# Patient Record
Sex: MALE | Race: WHITE | NOT HISPANIC OR LATINO | Employment: UNEMPLOYED | URBAN - METROPOLITAN AREA
[De-identification: names, ages, dates, MRNs, and addresses within clinical notes are randomized per-mention and may not be internally consistent; named-entity substitution may affect disease eponyms.]

---

## 2022-01-18 ENCOUNTER — TELEPHONE (OUTPATIENT)
Dept: FAMILY MEDICINE CLINIC | Facility: CLINIC | Age: 22
End: 2022-01-18

## 2022-01-18 ENCOUNTER — OFFICE VISIT (OUTPATIENT)
Dept: FAMILY MEDICINE CLINIC | Facility: CLINIC | Age: 22
End: 2022-01-18

## 2022-01-18 ENCOUNTER — LAB (OUTPATIENT)
Dept: FAMILY MEDICINE CLINIC | Facility: CLINIC | Age: 22
End: 2022-01-18

## 2022-01-18 VITALS
HEART RATE: 79 BPM | WEIGHT: 158.6 LBS | BODY MASS INDEX: 24.04 KG/M2 | OXYGEN SATURATION: 98 % | HEIGHT: 68 IN | TEMPERATURE: 98.4 F | SYSTOLIC BLOOD PRESSURE: 121 MMHG | DIASTOLIC BLOOD PRESSURE: 65 MMHG

## 2022-01-18 DIAGNOSIS — Z00.00 ENCOUNTER FOR WELL ADULT EXAM WITHOUT ABNORMAL FINDINGS: Primary | ICD-10-CM

## 2022-01-18 DIAGNOSIS — Z11.59 ENCOUNTER FOR HEPATITIS C SCREENING TEST FOR LOW RISK PATIENT: ICD-10-CM

## 2022-01-18 DIAGNOSIS — Z00.00 ENCOUNTER FOR WELL ADULT EXAM WITHOUT ABNORMAL FINDINGS: ICD-10-CM

## 2022-01-18 LAB
ALBUMIN SERPL-MCNC: 4.2 G/DL (ref 3.5–5.2)
ALBUMIN/GLOB SERPL: 1.8 G/DL
ALP SERPL-CCNC: 61 U/L (ref 39–117)
ALT SERPL W P-5'-P-CCNC: 23 U/L (ref 1–41)
ANION GAP SERPL CALCULATED.3IONS-SCNC: 5.8 MMOL/L (ref 5–15)
AST SERPL-CCNC: 23 U/L (ref 1–40)
BASOPHILS # BLD AUTO: 0.04 10*3/MM3 (ref 0–0.2)
BASOPHILS NFR BLD AUTO: 0.5 % (ref 0–1.5)
BILIRUB SERPL-MCNC: 0.3 MG/DL (ref 0–1.2)
BUN SERPL-MCNC: 15 MG/DL (ref 6–20)
BUN/CREAT SERPL: 19 (ref 7–25)
CALCIUM SPEC-SCNC: 9 MG/DL (ref 8.6–10.5)
CHLORIDE SERPL-SCNC: 103 MMOL/L (ref 98–107)
CO2 SERPL-SCNC: 29.2 MMOL/L (ref 22–29)
CREAT SERPL-MCNC: 0.79 MG/DL (ref 0.76–1.27)
DEPRECATED RDW RBC AUTO: 42.5 FL (ref 37–54)
EOSINOPHIL # BLD AUTO: 1.12 10*3/MM3 (ref 0–0.4)
EOSINOPHIL NFR BLD AUTO: 14 % (ref 0.3–6.2)
ERYTHROCYTE [DISTWIDTH] IN BLOOD BY AUTOMATED COUNT: 13 % (ref 12.3–15.4)
GFR SERPL CREATININE-BSD FRML MDRD: 124 ML/MIN/1.73
GLOBULIN UR ELPH-MCNC: 2.4 GM/DL
GLUCOSE SERPL-MCNC: 76 MG/DL (ref 65–99)
HCT VFR BLD AUTO: 44.6 % (ref 37.5–51)
HCV AB SER DONR QL: NORMAL
HGB BLD-MCNC: 14.8 G/DL (ref 13–17.7)
IMM GRANULOCYTES # BLD AUTO: 0.02 10*3/MM3 (ref 0–0.05)
IMM GRANULOCYTES NFR BLD AUTO: 0.3 % (ref 0–0.5)
LYMPHOCYTES # BLD AUTO: 1.66 10*3/MM3 (ref 0.7–3.1)
LYMPHOCYTES NFR BLD AUTO: 20.8 % (ref 19.6–45.3)
MCH RBC QN AUTO: 29.6 PG (ref 26.6–33)
MCHC RBC AUTO-ENTMCNC: 33.2 G/DL (ref 31.5–35.7)
MCV RBC AUTO: 89.2 FL (ref 79–97)
MONOCYTES # BLD AUTO: 0.56 10*3/MM3 (ref 0.1–0.9)
MONOCYTES NFR BLD AUTO: 7 % (ref 5–12)
NEUTROPHILS NFR BLD AUTO: 4.58 10*3/MM3 (ref 1.7–7)
NEUTROPHILS NFR BLD AUTO: 57.4 % (ref 42.7–76)
NRBC BLD AUTO-RTO: 0 /100 WBC (ref 0–0.2)
PLATELET # BLD AUTO: 152 10*3/MM3 (ref 140–450)
PMV BLD AUTO: 12 FL (ref 6–12)
POTASSIUM SERPL-SCNC: 4.6 MMOL/L (ref 3.5–5.2)
PROT SERPL-MCNC: 6.6 G/DL (ref 6–8.5)
RBC # BLD AUTO: 5 10*6/MM3 (ref 4.14–5.8)
SODIUM SERPL-SCNC: 138 MMOL/L (ref 136–145)
TSH SERPL DL<=0.05 MIU/L-ACNC: 1.9 UIU/ML (ref 0.27–4.2)
WBC NRBC COR # BLD: 7.98 10*3/MM3 (ref 3.4–10.8)

## 2022-01-18 PROCEDURE — 36415 COLL VENOUS BLD VENIPUNCTURE: CPT

## 2022-01-18 PROCEDURE — 86803 HEPATITIS C AB TEST: CPT | Performed by: FAMILY MEDICINE

## 2022-01-18 PROCEDURE — 80050 GENERAL HEALTH PANEL: CPT | Performed by: FAMILY MEDICINE

## 2022-01-18 PROCEDURE — 99385 PREV VISIT NEW AGE 18-39: CPT | Performed by: FAMILY MEDICINE

## 2022-01-18 RX ORDER — ESCITALOPRAM OXALATE 20 MG/1
TABLET ORAL
COMMUNITY
Start: 2021-12-05 | End: 2022-09-12

## 2022-01-18 RX ORDER — DIVALPROEX SODIUM 250 MG/1
TABLET, DELAYED RELEASE ORAL
COMMUNITY
Start: 2022-01-07

## 2022-01-18 NOTE — ASSESSMENT & PLAN NOTE
Discussed healthy diet and  importance of regular exercise and recommend starting or continuing a regular exercise program for good health.  Stressed importance of moderation in sodium/caffeine intake, saturated fat and cholesterol, caloric balance, sufficient intake of fresh fruits and vegetables.  Recommend annual flu shot and COVID-19 vaccine.

## 2022-01-18 NOTE — PROGRESS NOTES
Draius Ortiz is a 21 y.o. male.     21-year-old male patient present for establish care/annual physical.  He has known history of schizo affective/mood disorder under care of  [facility] in [facility].  He denies anxiety, depression, mood swings, behavioral changes and insomnia.  He is taking medication as prescribed and denies any medication related side effects.The patien denies fatigue, cold intolerance, headache, cough,chest pain, abdominal pain, nausea, vomiting, palpitations, dizziness and SOB. The patient admits to dietary compliance is  fair  and exercising occasionally.  He is a non-smoker.  He works at [employer].        The following portions of the patient's history were reviewed and updated as appropriate: past medical history, past social history, past surgical history and problem list.    Review of Systems   Constitutional: Negative for fatigue and fever.   HENT: Negative for ear pain, sinus pressure, sore throat and trouble swallowing.    Eyes: Negative for blurred vision.   Respiratory: Negative for cough, shortness of breath and wheezing.    Cardiovascular: Negative for chest pain, palpitations and leg swelling.   Gastrointestinal: Negative for abdominal pain, diarrhea, nausea and vomiting.   Endocrine: Negative for cold intolerance, heat intolerance, polydipsia, polyphagia and polyuria.   Musculoskeletal: Negative for back pain.   Skin: Negative for rash.   Neurological: Negative for dizziness and headache.   Psychiatric/Behavioral: Negative for agitation, behavioral problems, sleep disturbance, suicidal ideas and depressed mood. The patient is not nervous/anxious.      Vitals:    01/18/22 0823   BP: 121/65   Pulse: 79   Temp: 98.4 °F (36.9 °C)   SpO2: 98%     Body mass index is 24.12 kg/m².     Current Outpatient Medications on File Prior to Visit   Medication Sig Dispense Refill   • Cariprazine HCl (Vraylar) 1.5 MG capsule capsule Take 1.5 mg by mouth Daily.     • divalproex  (DEPAKOTE) 250 MG DR tablet      • escitalopram (LEXAPRO) 20 MG tablet        No current facility-administered medications on file prior to visit.         Objective   Physical Exam  Constitutional:       General: He is not in acute distress.     Appearance: He is well-developed.   HENT:      Right Ear: Tympanic membrane, ear canal and external ear normal.      Left Ear: Tympanic membrane, ear canal and external ear normal.   Eyes:      Conjunctiva/sclera: Conjunctivae normal.   Cardiovascular:      Rate and Rhythm: Normal rate.      Pulses: Normal pulses.      Heart sounds: Normal heart sounds.   Pulmonary:      Effort: Pulmonary effort is normal.      Breath sounds: Normal breath sounds. No wheezing.   Abdominal:      General: Bowel sounds are normal.      Palpations: Abdomen is soft.      Tenderness: There is no abdominal tenderness.   Musculoskeletal:         General: Normal range of motion.      Cervical back: Normal range of motion and neck supple.   Neurological:      Mental Status: He is alert and oriented to person, place, and time.   Psychiatric:         Mood and Affect: Mood normal.         Behavior: Behavior normal.           Assessment/Plan   Problems Addressed this Visit        Health Encounters    Encounter for well adult exam without abnormal findings - Primary     Discussed healthy diet and  importance of regular exercise and recommend starting or continuing a regular exercise program for good health.  Stressed importance of moderation in sodium/caffeine intake, saturated fat and cholesterol, caloric balance, sufficient intake of fresh fruits and vegetables.  Recommend annual flu shot and COVID-19 vaccine.         Relevant Orders    Comprehensive metabolic panel    CBC w AUTO Differential    TSH       Infectious Diseases    Encounter for hepatitis C screening test for low risk patient    Relevant Orders    Hepatitis C antibody      Diagnoses       Codes Comments    Encounter for well adult exam  without abnormal findings    -  Primary ICD-10-CM: Z00.00  ICD-9-CM: V70.0     Encounter for hepatitis C screening test for low risk patient     ICD-10-CM: Z11.59  ICD-9-CM: V73.89

## 2022-09-12 ENCOUNTER — OFFICE VISIT (OUTPATIENT)
Dept: FAMILY MEDICINE CLINIC | Facility: CLINIC | Age: 22
End: 2022-09-12

## 2022-09-12 VITALS
SYSTOLIC BLOOD PRESSURE: 114 MMHG | WEIGHT: 176 LBS | BODY MASS INDEX: 26.67 KG/M2 | DIASTOLIC BLOOD PRESSURE: 78 MMHG | OXYGEN SATURATION: 97 % | TEMPERATURE: 99.1 F | HEIGHT: 68 IN | HEART RATE: 106 BPM | RESPIRATION RATE: 16 BRPM

## 2022-09-12 DIAGNOSIS — F10.29 ALCOHOL DEPENDENCE WITH UNSPECIFIED ALCOHOL-INDUCED DISORDER: Primary | ICD-10-CM

## 2022-09-12 PROCEDURE — 99213 OFFICE O/P EST LOW 20 MIN: CPT | Performed by: FAMILY MEDICINE

## 2022-09-12 NOTE — PROGRESS NOTES
Darius Ortiz is a 21 y.o. male.     History of Present Illness  21-year-old male patient presents for ER follow-up.  Patient was seen at [facility] ER on 9/7/22 brought by EMS. The patient works at [employer], that day he was having tremor, acting odd with  behavior issue.  Patient has known history of alcohol abuse he is a heavy drinker.  He was sent to ER for further evaluation.  Hospital records reviewed patient breath  alcohol test was positive and he was treated with fluids and IV Ativan.  Patient  stated he is currently seeing therapist and  psychiatrist at [facility].  Also he will join AA meetings soon.  He denies headache, dizziness, anxiety, depression hallucination, trouble sleeping, nausea, vomiting and abdominal pain.         The following portions of the patient's history were reviewed and updated as appropriate: past medical history, past social history, past surgical history and problem list.    Review of Systems   Constitutional: Positive for fatigue. Negative for activity change and appetite change.   HENT: Negative for trouble swallowing.    Eyes: Negative for blurred vision.   Respiratory: Negative for shortness of breath.    Cardiovascular: Negative for chest pain and palpitations.   Gastrointestinal: Negative for abdominal pain, nausea, vomiting and indigestion.   Neurological: Negative for dizziness and headache.   Psychiatric/Behavioral: Negative for behavioral problems, sleep disturbance, suicidal ideas and depressed mood. The patient is not nervous/anxious.        Objective   Physical Exam  Vitals reviewed.   Constitutional:       General: He is not in acute distress.     Appearance: Normal appearance. He is well-developed.   Eyes:      Conjunctiva/sclera: Conjunctivae normal.   Neck:      Thyroid: No thyromegaly.   Cardiovascular:      Rate and Rhythm: Normal rate.      Heart sounds: Normal heart sounds.   Pulmonary:      Effort: Pulmonary effort is normal.       Breath sounds: Normal breath sounds. No wheezing.   Abdominal:      General: Bowel sounds are normal.      Palpations: Abdomen is soft.      Tenderness: There is no abdominal tenderness.   Musculoskeletal:         General: Normal range of motion.      Cervical back: Normal range of motion.   Neurological:      General: No focal deficit present.      Mental Status: He is alert and oriented to person, place, and time.   Psychiatric:         Mood and Affect: Mood normal.         Behavior: Behavior normal.       Vitals:    09/12/22 1545   BP: 114/78   Pulse: 106   Resp: 16   Temp: 99.1 °F (37.3 °C)   SpO2: 97%     Current Outpatient Medications on File Prior to Visit   Medication Sig Dispense Refill   • Cariprazine HCl (VRAYLAR) 1.5 MG capsule capsule Take 1.5 mg by mouth Daily.     • divalproex (DEPAKOTE) 250 MG DR tablet        No current facility-administered medications on file prior to visit.         Assessment & Plan   Problems Addressed this Visit        Mental Health    Alcohol dependence with unspecified alcohol-induced disorder (HCC) - Primary     [facility] ER records reviewed.  Patient currently seeing the therapist and psychiatrist at [facility], also state he has not drinks alcohol since hospital visit.   Discussed the hazards of alcohol use.  Recommended to join AA meetings to help to quit alcohol           Diagnoses       Codes Comments    Alcohol dependence with unspecified alcohol-induced disorder (HCC)- ER follow up    -  Primary ICD-10-CM: F10.29  ICD-9-CM: 303.90, 291.9

## 2022-09-13 ENCOUNTER — TELEPHONE (OUTPATIENT)
Dept: FAMILY MEDICINE CLINIC | Facility: CLINIC | Age: 22
End: 2022-09-13

## 2022-09-13 NOTE — TELEPHONE ENCOUNTER
Caller: Nathanael Arellano    Relationship: Self    Best call back number:[phone] (M)    What is the best time to reach you: ANYTIME, ASAP    Who are you requesting to speak with (clinical staff, provider,  specific staff member): CLINICAL STAFF/ DR BLAIR    Do you know the name of the person who called: NATHANAEL ARELLANO    What was the call regarding: PATIENT IS ASKING FOR RETURN TO WORK DOCUMENTATION FROM DR BLAIR THAT INCLUDES THE DIAGNOSIS FROM HIS EMERGENCY ROOM FILE OF SYMTPTOMS OF ALCOHOL WITHDRAWAL AND HER SIGNATURE SO HE CAN RETURN TO WORK ASAP, PLEASE ADVISE PATIENT WHEN THIS IS READY TO BE PICKED UP ASAP    Do you require a callback: YES, ASAP

## 2022-09-19 NOTE — ASSESSMENT & PLAN NOTE
[facility] ER records reviewed.  Patient currently seeing the therapist and psychiatrist at [facility], also state he has not drinks alcohol since hospital visit.   Discussed the hazards of alcohol use.  Recommended to join AA meetings to help to quit alcohol

## 2022-12-05 ENCOUNTER — HOSPITAL ENCOUNTER (OUTPATIENT)
Facility: HOSPITAL | Age: 22
Discharge: HOME | End: 2022-12-05
Attending: EMERGENCY MEDICINE | Admitting: EMERGENCY MEDICINE

## 2022-12-05 VITALS
HEIGHT: 70 IN | BODY MASS INDEX: 23.62 KG/M2 | WEIGHT: 165 LBS | RESPIRATION RATE: 16 BRPM | OXYGEN SATURATION: 97 % | SYSTOLIC BLOOD PRESSURE: 130 MMHG | TEMPERATURE: 97.7 F | DIASTOLIC BLOOD PRESSURE: 86 MMHG | HEART RATE: 122 BPM

## 2022-12-05 DIAGNOSIS — K21.9 GASTROESOPHAGEAL REFLUX DISEASE, UNSPECIFIED WHETHER ESOPHAGITIS PRESENT: ICD-10-CM

## 2022-12-05 DIAGNOSIS — Z13.9 ENCOUNTER FOR MEDICAL SCREENING EXAMINATION: Primary | ICD-10-CM

## 2022-12-05 LAB
FLUAV SUBTYP SPEC NAA+PROBE: NOT DETECTED
FLUBV RNA ISLT QL NAA+PROBE: NOT DETECTED
SARS-COV-2 RNA RESP QL NAA+PROBE: NOT DETECTED

## 2022-12-05 PROCEDURE — 87636 SARSCOV2 & INF A&B AMP PRB: CPT | Performed by: EMERGENCY MEDICINE

## 2022-12-05 PROCEDURE — 99202 OFFICE O/P NEW SF 15 MIN: CPT | Performed by: EMERGENCY MEDICINE

## 2022-12-05 PROCEDURE — G0463 HOSPITAL OUTPT CLINIC VISIT: HCPCS | Performed by: EMERGENCY MEDICINE

## 2023-01-23 ENCOUNTER — OFFICE VISIT (OUTPATIENT)
Dept: FAMILY MEDICINE CLINIC | Facility: CLINIC | Age: 23
End: 2023-01-23

## 2023-01-23 VITALS
WEIGHT: 181.4 LBS | BODY MASS INDEX: 25.97 KG/M2 | RESPIRATION RATE: 16 BRPM | HEIGHT: 70 IN | OXYGEN SATURATION: 99 % | TEMPERATURE: 97.7 F | HEART RATE: 104 BPM | SYSTOLIC BLOOD PRESSURE: 125 MMHG | DIASTOLIC BLOOD PRESSURE: 76 MMHG

## 2023-01-23 DIAGNOSIS — F10.29 ALCOHOL DEPENDENCE WITH UNSPECIFIED ALCOHOL-INDUCED DISORDER: ICD-10-CM

## 2023-01-23 DIAGNOSIS — F33.8 OTHER RECURRENT DEPRESSIVE DISORDERS: Primary | ICD-10-CM

## 2023-01-23 PROCEDURE — 99213 OFFICE O/P EST LOW 20 MIN: CPT | Performed by: FAMILY MEDICINE

## 2023-01-23 RX ORDER — FLUOXETINE HYDROCHLORIDE 20 MG/1
20 CAPSULE ORAL DAILY
COMMUNITY

## 2023-01-23 NOTE — PROGRESS NOTES
Darius Ortiz is a 22 y.o. male.     History of Present Illness     22-year-old male patient present for follow-up visit.  He is currently under the care of of psych and therapist at [facility] for depression and psycho affective disorder.  He has alcohol dependency per patient he quit for a month then he started drinking again since last 3 months.  Patient is stated he drank 1/8 of gin every night.  He is complaining of depressed mood, anxiety but denies trouble sleep, nausea, vomiting, abdominal pain, shortness of breath and chest pain.    The following portions of the patient's history were reviewed and updated as appropriate: past medical history, past social history, past surgical history and problem list.    Review of Systems   Cardiovascular: Negative for chest pain and palpitations.   Psychiatric/Behavioral: Positive for depressed mood. Negative for agitation, sleep disturbance and suicidal ideas. The patient is nervous/anxious.        Objective   Physical Exam  Vitals reviewed.   Pulmonary:      Effort: Pulmonary effort is normal.   Neurological:      Mental Status: He is alert and oriented to person, place, and time.   Psychiatric:         Mood and Affect: Mood is depressed.         Speech: Speech normal.         Behavior: Behavior normal.       Vitals:    01/23/23 0808   BP: 125/76   Pulse: 104   Resp: 16   Temp: 97.7 °F (36.5 °C)   SpO2: 99%     Current Outpatient Medications on File Prior to Visit   Medication Sig Dispense Refill   • divalproex (DEPAKOTE) 250 MG DR tablet      • FLUoxetine (PROzac) 20 MG capsule Take 20 mg by mouth Daily.     • [DISCONTINUED] Cariprazine HCl (VRAYLAR) 1.5 MG capsule capsule Take 1.5 mg by mouth Daily.       No current facility-administered medications on file prior to visit.           Assessment & Plan   Problems Addressed this Visit        Mental Health    Alcohol dependence with unspecified alcohol-induced disorder (HCC)     Discussed health hazards for  alcohol and smoking on the health.   Counseled to quit smoking and drinking.  Patient was going to AA meeting in the past but has stopped going, encouraged to join those meetings.         Other recurrent depressive disorders (HCC) - Primary     Patient is under care of psychiatrist at [facility].  He also sees therapist once a month.  His symptoms are currently stable at [facility].  The patient verified not suicidal at this time.         Relevant Medications    FLUoxetine (PROzac) 20 MG capsule   Diagnoses       Codes Comments    Other recurrent depressive disorders (HCC)    -  Primary ICD-10-CM: F33.8  ICD-9-CM: 296.99     Alcohol dependence with unspecified alcohol-induced disorder (HCC)     ICD-10-CM: F10.29  ICD-9-CM: 303.90, 291.9

## 2023-01-23 NOTE — ASSESSMENT & PLAN NOTE
Discussed health hazards for alcohol and smoking on the health.   Counseled to quit smoking and drinking.  Patient was going to AA meeting in the past but has stopped going, encouraged to join those meetings.

## 2023-01-23 NOTE — ASSESSMENT & PLAN NOTE
Patient is under care of psychiatrist at [facility].  He also sees therapist once a month.  His symptoms are currently stable at [facility].  The patient verified not suicidal at this time.

## 2024-03-12 ENCOUNTER — OFFICE VISIT (OUTPATIENT)
Dept: FAMILY MEDICINE CLINIC | Facility: CLINIC | Age: 24
End: 2024-03-12
Payer: MEDICAID

## 2024-03-12 ENCOUNTER — LAB (OUTPATIENT)
Dept: FAMILY MEDICINE CLINIC | Facility: CLINIC | Age: 24
End: 2024-03-12
Payer: MEDICAID

## 2024-03-12 VITALS
BODY MASS INDEX: 33.53 KG/M2 | DIASTOLIC BLOOD PRESSURE: 72 MMHG | HEART RATE: 63 BPM | OXYGEN SATURATION: 97 % | TEMPERATURE: 97.7 F | RESPIRATION RATE: 16 BRPM | HEIGHT: 70 IN | SYSTOLIC BLOOD PRESSURE: 104 MMHG | WEIGHT: 234.2 LBS

## 2024-03-12 DIAGNOSIS — Z00.00 ENCOUNTER FOR WELL ADULT EXAM WITHOUT ABNORMAL FINDINGS: ICD-10-CM

## 2024-03-12 DIAGNOSIS — F31.32 BIPOLAR AFFECTIVE DISORDER, CURRENTLY DEPRESSED, MODERATE: ICD-10-CM

## 2024-03-12 DIAGNOSIS — Z00.00 ENCOUNTER FOR WELL ADULT EXAM WITHOUT ABNORMAL FINDINGS: Primary | ICD-10-CM

## 2024-03-12 PROCEDURE — 80061 LIPID PANEL: CPT | Performed by: FAMILY MEDICINE

## 2024-03-12 PROCEDURE — 36415 COLL VENOUS BLD VENIPUNCTURE: CPT

## 2024-03-12 PROCEDURE — 80050 GENERAL HEALTH PANEL: CPT | Performed by: FAMILY MEDICINE

## 2024-03-12 PROCEDURE — 80164 ASSAY DIPROPYLACETIC ACD TOT: CPT

## 2024-03-12 RX ORDER — FLUOXETINE HYDROCHLORIDE 40 MG/1
40 CAPSULE ORAL DAILY
COMMUNITY
Start: 2024-03-05

## 2024-03-12 RX ORDER — DOXAZOSIN MESYLATE 1 MG/1
1 TABLET ORAL NIGHTLY
COMMUNITY

## 2024-03-12 RX ORDER — PROPRANOLOL HYDROCHLORIDE 20 MG/1
20 TABLET ORAL 2 TIMES DAILY
COMMUNITY
Start: 2024-02-19

## 2024-03-12 RX ORDER — PALIPERIDONE PALMITATE 234 MG/1.5ML
234 INJECTION INTRAMUSCULAR ONCE
COMMUNITY

## 2024-03-12 NOTE — PROGRESS NOTES
Darius Ortiz is a 23 y.o. male.     History of Present Illness  23-year-old male patient presents for annual physical exam.  Patient does state a month ago he was admitted at inpatient psych unit in [facility] due to drug overdose.  He is now under care of outpatient psych with [facility].  He is taking Depakote, fluoxetine and Invega.  He is tolerating medication well and denies any medication related side effects.  He is complaining of anxiety but denies depressed mood, difficulty concentration, palpitation, shortness of breath, chest pain, trouble sleep, nausea, vomiting, abdominal pain and dysuria.  Patient is stated he has also quit smoking and drinking alcohol.     The patient comes in today for annual physical.  The patient is doing well denies fatigue, cold intolerance, headache, cough,chest pain, palpitations, dizziness and SOB. The patient admits to dietary compliance is  fair  and exercising occasionally.       The following portions of the patient's history were reviewed and updated as appropriate: past medical history, past social history, past surgical history and problem list.    Review of Systems   Constitutional:  Negative for activity change, appetite change and fatigue.   Respiratory:  Negative for shortness of breath and wheezing.    Cardiovascular:  Negative for chest pain and palpitations.   Gastrointestinal:  Negative for abdominal pain, nausea, vomiting and indigestion.   Endocrine: Negative for polyphagia and polyuria.   Neurological:  Negative for dizziness and headache.   Psychiatric/Behavioral:  Negative for sleep disturbance and depressed mood. The patient is nervous/anxious.        Objective   Physical Exam  Vitals reviewed.   Constitutional:       General: He is not in acute distress.     Appearance: He is well-developed.   Neck:      Thyroid: No thyromegaly.   Cardiovascular:      Heart sounds: Normal heart sounds.   Pulmonary:      Effort: Pulmonary effort  is normal.      Breath sounds: Normal breath sounds. No wheezing.   Abdominal:      Tenderness: There is no abdominal tenderness.   Musculoskeletal:      Cervical back: Normal range of motion and neck supple.   Neurological:      Mental Status: He is alert and oriented to person, place, and time.   Psychiatric:         Mood and Affect: Mood normal.         Vitals:    03/12/24 1400   BP: 104/72   Pulse: 63   Resp: 16   Temp: 97.7 °F (36.5 °C)   SpO2: 97%   Body mass index is 33.6 kg/m².  BMI is >= 30 and <35. (Class 1 Obesity). The following options were offered after discussion;: exercise counseling/recommendations and nutrition counseling/recommendations     Current Outpatient Medications on File Prior to Visit   Medication Sig Dispense Refill    divalproex (DEPAKOTE) 250 MG DR tablet       doxazosin (CARDURA) 1 MG tablet Take 1 tablet by mouth Every Night.      FLUoxetine (PROzac) 40 MG capsule Take 1 capsule by mouth Daily.      paliperidone palmitate (Invega Sustenna) 234 MG/1.5ML suspension prefilled syringe IM injection Inject 1.5 mL into the appropriate muscle as directed by prescriber 1 (One) Time.      propranolol (INDERAL) 20 MG tablet Take 1 tablet by mouth 2 (Two) Times a Day.       No current facility-administered medications on file prior to visit.         Assessment & Plan   Problems Addressed this Visit       Encounter for well adult exam without abnormal findings - Primary     Discussed healthy diet and  importance of regular exercise. Stressed importance of moderation in sodium/caffeine intake,  cholesterol, caloric balance, sufficient intake of fresh fruits and vegetables.           Relevant Orders    Comprehensive metabolic panel (Completed)    TSH (Completed)    Lipid panel (Completed)    CBC w AUTO Differential (Completed)    Bipolar affective disorder, currently depressed, moderate     Stable -advised to keep appointment with psych as scheduled and continue current medications.          Relevant Medications    FLUoxetine (PROzac) 40 MG capsule    paliperidone palmitate (Invega Sustenna) 234 MG/1.5ML suspension prefilled syringe IM injection    Other Relevant Orders    Valproic Acid Level, Total (Completed)     Diagnoses         Codes Comments    Encounter for well adult exam without abnormal findings    -  Primary ICD-10-CM: Z00.00  ICD-9-CM: V70.0     Bipolar affective disorder, currently depressed, moderate     ICD-10-CM: F31.32  ICD-9-CM: 296.52

## 2024-03-13 PROBLEM — F31.32 BIPOLAR AFFECTIVE DISORDER, CURRENTLY DEPRESSED, MODERATE: Status: ACTIVE | Noted: 2024-03-13

## 2024-03-13 LAB
ALBUMIN SERPL-MCNC: 4.4 G/DL (ref 3.5–5.2)
ALBUMIN/GLOB SERPL: 1.7 G/DL
ALP SERPL-CCNC: 91 U/L (ref 39–117)
ALT SERPL W P-5'-P-CCNC: 40 U/L (ref 1–41)
ANION GAP SERPL CALCULATED.3IONS-SCNC: 11 MMOL/L (ref 5–15)
AST SERPL-CCNC: 23 U/L (ref 1–40)
BASOPHILS # BLD AUTO: 0.05 10*3/MM3 (ref 0–0.2)
BASOPHILS NFR BLD AUTO: 0.6 % (ref 0–1.5)
BILIRUB SERPL-MCNC: 0.4 MG/DL (ref 0–1.2)
BUN SERPL-MCNC: 14 MG/DL (ref 6–20)
BUN/CREAT SERPL: 15.1 (ref 7–25)
CALCIUM SPEC-SCNC: 9.7 MG/DL (ref 8.6–10.5)
CHLORIDE SERPL-SCNC: 100 MMOL/L (ref 98–107)
CHOLEST SERPL-MCNC: 208 MG/DL (ref 0–200)
CO2 SERPL-SCNC: 25 MMOL/L (ref 22–29)
CREAT SERPL-MCNC: 0.93 MG/DL (ref 0.76–1.27)
DEPRECATED RDW RBC AUTO: 39.3 FL (ref 37–54)
EGFRCR SERPLBLD CKD-EPI 2021: 118.3 ML/MIN/1.73
EOSINOPHIL # BLD AUTO: 0.89 10*3/MM3 (ref 0–0.4)
EOSINOPHIL NFR BLD AUTO: 10.5 % (ref 0.3–6.2)
ERYTHROCYTE [DISTWIDTH] IN BLOOD BY AUTOMATED COUNT: 12.4 % (ref 12.3–15.4)
GLOBULIN UR ELPH-MCNC: 2.6 GM/DL
GLUCOSE SERPL-MCNC: 86 MG/DL (ref 65–99)
HCT VFR BLD AUTO: 44.6 % (ref 37.5–51)
HDLC SERPL-MCNC: 50 MG/DL (ref 40–60)
HGB BLD-MCNC: 14.7 G/DL (ref 13–17.7)
IMM GRANULOCYTES # BLD AUTO: 0.03 10*3/MM3 (ref 0–0.05)
IMM GRANULOCYTES NFR BLD AUTO: 0.4 % (ref 0–0.5)
LDLC SERPL CALC-MCNC: 136 MG/DL (ref 0–100)
LDLC/HDLC SERPL: 2.68 {RATIO}
LYMPHOCYTES # BLD AUTO: 2.26 10*3/MM3 (ref 0.7–3.1)
LYMPHOCYTES NFR BLD AUTO: 26.7 % (ref 19.6–45.3)
MCH RBC QN AUTO: 28.7 PG (ref 26.6–33)
MCHC RBC AUTO-ENTMCNC: 33 G/DL (ref 31.5–35.7)
MCV RBC AUTO: 86.9 FL (ref 79–97)
MONOCYTES # BLD AUTO: 0.65 10*3/MM3 (ref 0.1–0.9)
MONOCYTES NFR BLD AUTO: 7.7 % (ref 5–12)
NEUTROPHILS NFR BLD AUTO: 4.57 10*3/MM3 (ref 1.7–7)
NEUTROPHILS NFR BLD AUTO: 54.1 % (ref 42.7–76)
NRBC BLD AUTO-RTO: 0 /100 WBC (ref 0–0.2)
PLATELET # BLD AUTO: 180 10*3/MM3 (ref 140–450)
PMV BLD AUTO: 11.8 FL (ref 6–12)
POTASSIUM SERPL-SCNC: 4.2 MMOL/L (ref 3.5–5.2)
PROT SERPL-MCNC: 7 G/DL (ref 6–8.5)
RBC # BLD AUTO: 5.13 10*6/MM3 (ref 4.14–5.8)
SODIUM SERPL-SCNC: 136 MMOL/L (ref 136–145)
TRIGL SERPL-MCNC: 121 MG/DL (ref 0–150)
TSH SERPL DL<=0.05 MIU/L-ACNC: 3 UIU/ML (ref 0.27–4.2)
VALPROATE SERPL-MCNC: 32 MCG/ML (ref 50–125)
VLDLC SERPL-MCNC: 22 MG/DL (ref 5–40)
WBC NRBC COR # BLD AUTO: 8.45 10*3/MM3 (ref 3.4–10.8)

## 2024-12-19 ENCOUNTER — OFFICE VISIT (OUTPATIENT)
Dept: FAMILY MEDICINE CLINIC | Facility: CLINIC | Age: 24
End: 2024-12-19
Payer: MEDICAID

## 2024-12-19 ENCOUNTER — LAB (OUTPATIENT)
Dept: FAMILY MEDICINE CLINIC | Facility: CLINIC | Age: 24
End: 2024-12-19
Payer: MEDICAID

## 2024-12-19 VITALS
SYSTOLIC BLOOD PRESSURE: 105 MMHG | BODY MASS INDEX: 37.67 KG/M2 | WEIGHT: 263.1 LBS | HEART RATE: 70 BPM | TEMPERATURE: 97.3 F | RESPIRATION RATE: 16 BRPM | DIASTOLIC BLOOD PRESSURE: 72 MMHG | HEIGHT: 70 IN | OXYGEN SATURATION: 97 %

## 2024-12-19 DIAGNOSIS — R53.83 FATIGUE, UNSPECIFIED TYPE: ICD-10-CM

## 2024-12-19 DIAGNOSIS — R79.89 ELEVATED TSH: Primary | ICD-10-CM

## 2024-12-19 DIAGNOSIS — R63.5 WEIGHT GAIN: ICD-10-CM

## 2024-12-19 DIAGNOSIS — E78.1 HYPERTRIGLYCERIDEMIA: ICD-10-CM

## 2024-12-19 DIAGNOSIS — R79.89 ELEVATED TSH: ICD-10-CM

## 2024-12-19 PROCEDURE — 1160F RVW MEDS BY RX/DR IN RCRD: CPT | Performed by: FAMILY MEDICINE

## 2024-12-19 PROCEDURE — 36415 COLL VENOUS BLD VENIPUNCTURE: CPT

## 2024-12-19 PROCEDURE — 84439 ASSAY OF FREE THYROXINE: CPT | Performed by: FAMILY MEDICINE

## 2024-12-19 PROCEDURE — 1159F MED LIST DOCD IN RCRD: CPT | Performed by: FAMILY MEDICINE

## 2024-12-19 PROCEDURE — 99214 OFFICE O/P EST MOD 30 MIN: CPT | Performed by: FAMILY MEDICINE

## 2024-12-19 PROCEDURE — 86376 MICROSOMAL ANTIBODY EACH: CPT | Performed by: FAMILY MEDICINE

## 2024-12-19 PROCEDURE — 84481 FREE ASSAY (FT-3): CPT | Performed by: FAMILY MEDICINE

## 2024-12-19 PROCEDURE — 84443 ASSAY THYROID STIM HORMONE: CPT | Performed by: FAMILY MEDICINE

## 2024-12-19 RX ORDER — OLANZAPINE AND SAMIDORPHAN L-MALATE 10; 10 MG/1; MG/1
10 TABLET, FILM COATED ORAL ONCE
COMMUNITY
Start: 2024-12-09

## 2024-12-19 RX ORDER — DEXTROMETHORPHAN HYDROBROMIDE, BUPROPION HYDROCHLORIDE 105; 45 MG/1; MG/1
1 TABLET, MULTILAYER, EXTENDED RELEASE ORAL
COMMUNITY
Start: 2024-11-26

## 2024-12-19 NOTE — PROGRESS NOTES
Darius Ortiz is a 24 y.o. male.     History of Present Illness     History of Present Illness  The patient is a 24-year-old male who presents for follow-up and to discuss abnormal lab results.  He had his labs done on 11/07/2024 by his psych. Lab result reviewed showed elevated TSH level of 6.1. His TSH levels were within the normal range during his last lab work in 09/2024. He reports experiencing fatigue, increased sleep, weight gain and an increased appetite. He does not report any gastrointestinal issues such as constipation or diarrhea, difficulty swallowing, or neck pain. His recent medication change is  Depakote dosage was increased by psych from 1000 mg to 1500 mg daily dose.     The following portions of the patient's history were reviewed and updated as appropriate: past medical history, past social history, past surgical history and problem list.    Review of Systems   Constitutional:  Positive for appetite change, fatigue and unexpected weight loss. Negative for activity change.   Respiratory:  Negative for shortness of breath.    Cardiovascular:  Negative for chest pain and palpitations.   Gastrointestinal:  Negative for abdominal pain, constipation and diarrhea.   Endocrine: Negative for cold intolerance and heat intolerance.   Psychiatric/Behavioral:  Negative for depressed mood. The patient is not nervous/anxious.        Results  Laboratory Studies  TSH was high at 6.1. Triglyceride was high at 221. Total cholesterol 158, HDL 33, LDL 88.    Objective   Physical Exam  Vitals reviewed.   Constitutional:       Appearance: He is well-developed.   Neck:      Thyroid: No thyromegaly.   Cardiovascular:      Heart sounds: Normal heart sounds.   Pulmonary:      Effort: Pulmonary effort is normal.      Breath sounds: Normal breath sounds.   Abdominal:      Tenderness: There is no abdominal tenderness.   Musculoskeletal:      Cervical back: Normal range of motion and neck supple. No tenderness.    Neurological:      Mental Status: He is alert and oriented to person, place, and time.   Psychiatric:         Mood and Affect: Mood normal.         Vitals:    12/19/24 1252   BP: 105/72   Pulse: 70   Resp: 16   Temp: 97.3 °F (36.3 °C)   SpO2: 97%     Current Outpatient Medications on File Prior to Visit   Medication Sig Dispense Refill    Auvelity  MG tablet controlled-release Take 1 tablet by mouth.      divalproex (DEPAKOTE) 250 MG DR tablet Take 2 tablets by mouth 2 (Two) Times a Day.      Lybalvi 10-10 MG tablet Take 10 tablets by mouth 1 (One) Time.      propranolol (INDERAL) 20 MG tablet Take 1 tablet by mouth 2 (Two) Times a Day.      [DISCONTINUED] doxazosin (CARDURA) 1 MG tablet Take 1 tablet by mouth Every Night. (Patient not taking: Reported on 12/19/2024)      [DISCONTINUED] FLUoxetine (PROzac) 40 MG capsule Take 1 capsule by mouth Daily. (Patient not taking: Reported on 12/19/2024)      [DISCONTINUED] paliperidone palmitate (Invega Sustenna) 234 MG/1.5ML suspension prefilled syringe IM injection Inject 1.5 mL into the appropriate muscle as directed by prescriber 1 (One) Time. (Patient not taking: Reported on 12/19/2024)       No current facility-administered medications on file prior to visit.         Assessment & Plan   Problems Addressed this Visit       Elevated TSH - Primary    Relevant Orders    T4, free    T3, free    TSH    Thyroid Peroxidase Antibody    Fatigue    Relevant Orders    T4, free    T3, free    TSH    Thyroid Peroxidase Antibody    Weight gain    Relevant Orders    T4, free    T3, free    TSH    Thyroid Peroxidase Antibody    Hypertriglyceridemia     Diagnoses         Codes Comments    Elevated TSH    -  Primary ICD-10-CM: R79.89  ICD-9-CM: 794.5     Fatigue, unspecified type     ICD-10-CM: R53.83  ICD-9-CM: 780.79     Weight gain     ICD-10-CM: R63.5  ICD-9-CM: 783.1     Hypertriglyceridemia     ICD-10-CM: E78.1  ICD-9-CM: 272.1             Assessment & Plan  1.  Elevated  TSH level.  His TSH level was elevated at 6.1, which may be influenced by his Depakote medication. He reports feeling more tired than usual and has an increased appetite.  A comprehensive thyroid panel, including T3, T4, TSH, and thyroid antibodies, will be conducted today. If the repeat tests show high levels, levothyroxine therapy may be initiated.    2. Hypertriglyceridemia.  His triglyceride level was elevated at 221, while his total cholesterol was 158, HDL was 33, and LDL was 88. He is advised to increase physical activity and exercise to improve his HDL levels and watch low fat diet.             Patient or patient representative verbalized consent for the use of Ambient Listening during the visit with  Kari Joyce MD for chart documentation. 12/19/2024  13:12 EST

## 2024-12-20 LAB
T3FREE SERPL-MCNC: 3.83 PG/ML (ref 2–4.4)
T4 FREE SERPL-MCNC: 1.05 NG/DL (ref 0.92–1.68)
THYROPEROXIDASE AB SERPL-ACNC: <9 IU/ML (ref 0–34)
TSH SERPL DL<=0.05 MIU/L-ACNC: 4.14 UIU/ML (ref 0.27–4.2)

## 2025-03-20 ENCOUNTER — LAB (OUTPATIENT)
Dept: FAMILY MEDICINE CLINIC | Facility: CLINIC | Age: 25
End: 2025-03-20
Payer: MEDICAID

## 2025-03-20 ENCOUNTER — OFFICE VISIT (OUTPATIENT)
Dept: FAMILY MEDICINE CLINIC | Facility: CLINIC | Age: 25
End: 2025-03-20
Payer: MEDICAID

## 2025-03-20 VITALS
SYSTOLIC BLOOD PRESSURE: 110 MMHG | DIASTOLIC BLOOD PRESSURE: 61 MMHG | HEIGHT: 70 IN | TEMPERATURE: 97.5 F | HEART RATE: 75 BPM | WEIGHT: 253.9 LBS | OXYGEN SATURATION: 97 % | RESPIRATION RATE: 16 BRPM | BODY MASS INDEX: 36.35 KG/M2

## 2025-03-20 DIAGNOSIS — Z00.00 ENCOUNTER FOR WELL ADULT EXAM WITHOUT ABNORMAL FINDINGS: ICD-10-CM

## 2025-03-20 DIAGNOSIS — Z00.00 ENCOUNTER FOR WELL ADULT EXAM WITHOUT ABNORMAL FINDINGS: Primary | ICD-10-CM

## 2025-03-20 LAB
ALBUMIN SERPL-MCNC: 4 G/DL (ref 3.5–5.2)
ALBUMIN/GLOB SERPL: 1.4 G/DL
ALP SERPL-CCNC: 99 U/L (ref 39–117)
ALT SERPL W P-5'-P-CCNC: 105 U/L (ref 1–41)
ANION GAP SERPL CALCULATED.3IONS-SCNC: 11.1 MMOL/L (ref 5–15)
AST SERPL-CCNC: 45 U/L (ref 1–40)
BILIRUB SERPL-MCNC: 0.2 MG/DL (ref 0–1.2)
BUN SERPL-MCNC: 11 MG/DL (ref 6–20)
BUN/CREAT SERPL: 12.2 (ref 7–25)
CALCIUM SPEC-SCNC: 9.6 MG/DL (ref 8.6–10.5)
CHLORIDE SERPL-SCNC: 106 MMOL/L (ref 98–107)
CHOLEST SERPL-MCNC: 180 MG/DL (ref 0–200)
CO2 SERPL-SCNC: 25.9 MMOL/L (ref 22–29)
CREAT SERPL-MCNC: 0.9 MG/DL (ref 0.76–1.27)
DEPRECATED RDW RBC AUTO: 43.9 FL (ref 37–54)
EGFRCR SERPLBLD CKD-EPI 2021: 122.3 ML/MIN/1.73
ERYTHROCYTE [DISTWIDTH] IN BLOOD BY AUTOMATED COUNT: 13.5 % (ref 12.3–15.4)
GLOBULIN UR ELPH-MCNC: 2.8 GM/DL
GLUCOSE SERPL-MCNC: 72 MG/DL (ref 65–99)
HCT VFR BLD AUTO: 45.9 % (ref 37.5–51)
HDLC SERPL-MCNC: 31 MG/DL (ref 40–60)
HGB BLD-MCNC: 15.5 G/DL (ref 13–17.7)
LDLC SERPL CALC-MCNC: 99 MG/DL (ref 0–100)
LDLC/HDLC SERPL: 2.9 {RATIO}
MCH RBC QN AUTO: 29.8 PG (ref 26.6–33)
MCHC RBC AUTO-ENTMCNC: 33.8 G/DL (ref 31.5–35.7)
MCV RBC AUTO: 88.1 FL (ref 79–97)
PLATELET # BLD AUTO: 192 10*3/MM3 (ref 140–450)
PMV BLD AUTO: 12.5 FL (ref 6–12)
POTASSIUM SERPL-SCNC: 4.3 MMOL/L (ref 3.5–5.2)
PROT SERPL-MCNC: 6.8 G/DL (ref 6–8.5)
RBC # BLD AUTO: 5.21 10*6/MM3 (ref 4.14–5.8)
SODIUM SERPL-SCNC: 143 MMOL/L (ref 136–145)
TRIGL SERPL-MCNC: 296 MG/DL (ref 0–150)
TSH SERPL DL<=0.05 MIU/L-ACNC: 4.72 UIU/ML (ref 0.27–4.2)
VLDLC SERPL-MCNC: 50 MG/DL (ref 5–40)
WBC NRBC COR # BLD AUTO: 7.6 10*3/MM3 (ref 3.4–10.8)

## 2025-03-20 PROCEDURE — 80061 LIPID PANEL: CPT | Performed by: FAMILY MEDICINE

## 2025-03-20 PROCEDURE — 36415 COLL VENOUS BLD VENIPUNCTURE: CPT

## 2025-03-20 PROCEDURE — 80050 GENERAL HEALTH PANEL: CPT | Performed by: FAMILY MEDICINE

## 2025-03-20 NOTE — PROGRESS NOTES
Darius Ortiz is a 24 y.o. male.     History of Present Illness     History of Present Illness  The patient is a 24-year-old male who presents for an annual physical exam. He reports no recent respiratory symptoms such as cough, congestion, runny nose, shortness of breath, sore throat, or earache. He experienced mild nausea yesterday but has not had any episodes today. He also reports no gastrointestinal symptoms such as vomiting, constipation, diarrhea, or abdominal pain. Additionally, he reports no urinary symptoms such as frequent urination, painful urination, or blood in the urine. He has been adhering to a low-calorie diet and increasing his water intake, which has resulted in a weight loss of 10 pounds since December 2024. He has also increased his physical activity, particularly walking.  He is a non-smoker.  He continues to see his psychiatrist and takes his mood medication without any changes.  He denies anxiety, depression or trouble sleep.      The following portions of the patient's history were reviewed and updated as appropriate: past medical history, past social history, past surgical history and problem list.    Review of Systems   Constitutional:  Negative for activity change and appetite change.   HENT:  Negative for congestion, ear pain and sore throat.    Respiratory:  Negative for cough and shortness of breath.    Cardiovascular:  Negative for chest pain and palpitations.   Gastrointestinal:  Negative for abdominal pain, constipation, diarrhea, vomiting and indigestion.   Genitourinary:  Negative for dysuria and frequency.   Neurological:  Negative for headache.   Psychiatric/Behavioral:  Negative for sleep disturbance. The patient is not nervous/anxious.          Objective   Physical Exam  Vitals reviewed.   Constitutional:       Appearance: He is well-developed.   Neck:      Thyroid: No thyromegaly.   Cardiovascular:      Heart sounds: Normal heart sounds.   Pulmonary:      Effort:  Pulmonary effort is normal.      Breath sounds: Normal breath sounds. No wheezing.   Abdominal:      Tenderness: There is no abdominal tenderness.   Neurological:      Mental Status: He is alert and oriented to person, place, and time.   Psychiatric:         Mood and Affect: Mood normal.         Vitals:    03/20/25 0938   BP: 110/61   Pulse: 75   Resp: 16   Temp: 97.5 °F (36.4 °C)   SpO2: 97%   Body mass index is 36.43 kg/m².  Class 2 Severe Obesity (BMI >=35 and <=39.9). Obesity-related health conditions include the following: dyslipidemias. Obesity is unchanged. BMI is is above average; BMI management plan is completed. We discussed portion control and increasing exercise.   Current Outpatient Medications on File Prior to Visit   Medication Sig Dispense Refill    Auvelity  MG tablet controlled-release Take 1 tablet by mouth.      divalproex (DEPAKOTE) 250 MG DR tablet Take 2 tablets by mouth 2 (Two) Times a Day.      Lybalvi 10-10 MG tablet Take 10 tablets by mouth 1 (One) Time.      propranolol (INDERAL) 20 MG tablet Take 1 tablet by mouth 2 (Two) Times a Day.       No current facility-administered medications on file prior to visit.         Assessment & Plan   Problems Addressed this Visit       Encounter for well adult exam without abnormal findings - Primary    Relevant Orders    Comprehensive metabolic panel    TSH    Lipid panel    CBC No Differential     Diagnoses         Codes Comments      Encounter for well adult exam without abnormal findings    -  Primary ICD-10-CM: Z00.00  ICD-9-CM: V70.0             Assessment & Plan  1. Annual physical examination.  Discussed healthy diet and  importance of regular exercise. Stressed importance of moderation in sodium/caffeine intake,  cholesterol, caloric balance, sufficient intake of fresh fruits and vegetables.   He has experienced a weight loss of 10 pounds since December 2024, attributed to dietary modifications and increased physical activity. [facility]  comprehensive blood work will be conducted, including TSH level, cholesterol, kidney function, liver function, blood count, and blood sugar.               Patient or patient representative verbalized consent for the use of Ambient Listening during the visit with  Kari Joyce MD for chart documentation. 3/20/2025  09:45 EDT

## 2025-03-28 ENCOUNTER — RESULTS FOLLOW-UP (OUTPATIENT)
Dept: FAMILY MEDICINE CLINIC | Facility: CLINIC | Age: 25
End: 2025-03-28
Payer: MEDICAID

## 2025-06-16 ENCOUNTER — OFFICE VISIT (OUTPATIENT)
Dept: FAMILY MEDICINE CLINIC | Facility: CLINIC | Age: 25
End: 2025-06-16
Payer: OTHER GOVERNMENT

## 2025-06-16 VITALS
HEIGHT: 70 IN | TEMPERATURE: 97.2 F | WEIGHT: 266.8 LBS | DIASTOLIC BLOOD PRESSURE: 75 MMHG | OXYGEN SATURATION: 97 % | RESPIRATION RATE: 16 BRPM | HEART RATE: 72 BPM | SYSTOLIC BLOOD PRESSURE: 109 MMHG | BODY MASS INDEX: 38.2 KG/M2

## 2025-06-16 DIAGNOSIS — Z09 HOSPITAL DISCHARGE FOLLOW-UP: Primary | ICD-10-CM

## 2025-06-16 DIAGNOSIS — E78.1 HYPERTRIGLYCERIDEMIA: ICD-10-CM

## 2025-06-16 DIAGNOSIS — E03.9 HYPOTHYROIDISM, UNSPECIFIED TYPE: ICD-10-CM

## 2025-06-16 PROCEDURE — 1160F RVW MEDS BY RX/DR IN RCRD: CPT | Performed by: FAMILY MEDICINE

## 2025-06-16 PROCEDURE — 1159F MED LIST DOCD IN RCRD: CPT | Performed by: FAMILY MEDICINE

## 2025-06-16 PROCEDURE — 99214 OFFICE O/P EST MOD 30 MIN: CPT | Performed by: FAMILY MEDICINE

## 2025-06-16 RX ORDER — LEVOTHYROXINE SODIUM 150 UG/1
150 TABLET ORAL
COMMUNITY
Start: 2025-05-23

## 2025-06-16 NOTE — PROGRESS NOTES
Darius Ortiz is a 24 y.o. male.     History of Present Illness     History of Present Illness  The patient is a 24-year-old male who presents for a hospital follow-up visit. He was seen at emergency room on 03/31/2025 due to a drug overdose involving Benadryl. Following this incident, he was discharged to [facility] for inpatient rehabilitation on 04/03/2025, where he remained until 05/01/2025. Since completing the 28-day program, he has maintained sobriety for the past 2 months. He reports no current alcohol consumption or illicit drug use.   Lab work was conducted on 04/18/2025 while he was in the program, and he had an Echo cardiogram. He reports that his echocardiogram results were normal. He is currently under the care of a psychiatrist at [facility],  and is prescribed Depakote and Auvelity.    The patient also  presents with follow up on on hypothyroidism.  He complains of fatigue but denies chest pain, palpitations, shortness of breath, trouble swallowing, anxiety, or depression.  Since the last visit, he was initiated on levothyroxine 150 mcg during his stay at [facility] and continues to take it in the morning.       The following portions of the patient's history were reviewed and updated as appropriate: past medical history, past social history, past surgical history and problem list.    Review of Systems   Constitutional:  Positive for fatigue.   HENT:  Negative for trouble swallowing.    Respiratory:  Negative for shortness of breath.    Cardiovascular:  Negative for chest pain and palpitations.   Gastrointestinal:  Negative for abdominal pain, nausea, vomiting and indigestion.   Endocrine: Negative for cold intolerance and heat intolerance.   Neurological:  Negative for headache.   Psychiatric/Behavioral:  Negative for agitation, behavioral problems, sleep disturbance and depressed mood. The patient is not nervous/anxious.        Results  Labs   - TSH: 5.08   - Blood sugar: 93   -  Liver enzymes: AST 23, ALT 42   - Triglyceride: 296    Imaging   - Doppler echocardiogram of heart: Normal    Objective   Physical Exam  Vitals reviewed.   Constitutional:       Appearance: He is well-developed.   Neck:      Thyroid: No thyromegaly.   Cardiovascular:      Heart sounds: Normal heart sounds.   Pulmonary:      Effort: Pulmonary effort is normal.      Breath sounds: Normal breath sounds. No wheezing.   Abdominal:      Tenderness: There is no abdominal tenderness.   Musculoskeletal:      Cervical back: Normal range of motion and neck supple. No tenderness.   Neurological:      Mental Status: He is alert and oriented to person, place, and time.   Psychiatric:         Mood and Affect: Mood normal.         Vitals:    06/16/25 1305   BP: 109/75   Pulse: 72   Resp: 16   Temp: 97.2 °F (36.2 °C)   SpO2: 97%     Current Outpatient Medications on File Prior to Visit   Medication Sig Dispense Refill    Auvelity  MG tablet controlled-release Take 1 tablet by mouth.      divalproex (DEPAKOTE) 250 MG DR tablet Take 2 tablets by mouth 2 (Two) Times a Day.      levothyroxine (SYNTHROID, LEVOTHROID) 150 MCG tablet Take 1 tablet by mouth.      Lybalvi 10-10 MG tablet Take 10 tablets by mouth 1 (One) Time.      propranolol (INDERAL) 20 MG tablet Take 1 tablet by mouth 2 (Two) Times a Day.       No current facility-administered medications on file prior to visit.         Assessment & Plan   Problems Addressed this Visit       Hypertriglyceridemia    Hypothyroidism    Relevant Medications    levothyroxine (SYNTHROID, LEVOTHROID) 150 MCG tablet    Hospital discharge follow-up - Primary     Diagnoses         Codes Comments      Hospital discharge follow-up ( from inpatient Rehab Program)    -  Primary ICD-10-CM: Z09  ICD-9-CM: V67.59       Hypothyroidism, unspecified type     ICD-10-CM: E03.9  ICD-9-CM: 244.9       Hypertriglyceridemia     ICD-10-CM: E78.1  ICD-9-CM: 272.1             Assessment & Plan  Hospital follow  up from [facility].  . He was seen at ED  in 3/31/25 with drug overdose and was admitted for 28 days for Rehab Program.  -Record reviewed from [facility] elevated TSH, low T4, normal LFTs, BUN and creatinine.Normal Echo cardiogram.  - He is under care of psychiatrist and taking medication as prescribed.    2.  Hypothyroidism.  - Lab result reviewed thyroid panel was done 5/22/25.   - He is currently on levothyroxine 150 mcg daily in the morning.  - Continue current medication and we will check TSH level in 3 months.    2. Hypertriglyceridemia.  - Triglyceride level was significantly elevated at 296 in 03/2025, which is above the normal range of <150.  - He has been watching his diet and exercise.   - A fasting blood work will be ordered to reassess lipid panel.  - Discussed dietary changes and lifestyle modifications.           Patient or patient representative verbalized consent for the use of Ambient Listening during the visit with  Kari Joyce MD for chart documentation. 6/16/2025  13:14 EDT

## 2025-07-29 RX ORDER — LEVOTHYROXINE SODIUM 150 UG/1
150 TABLET ORAL
Qty: 90 TABLET | Refills: 3 | Status: SHIPPED | OUTPATIENT
Start: 2025-07-29